# Patient Record
Sex: FEMALE | Race: WHITE | URBAN - METROPOLITAN AREA
[De-identification: names, ages, dates, MRNs, and addresses within clinical notes are randomized per-mention and may not be internally consistent; named-entity substitution may affect disease eponyms.]

---

## 2020-12-01 ENCOUNTER — OFFICE VISIT (OUTPATIENT)
Dept: URBAN - METROPOLITAN AREA CLINIC 45 | Facility: CLINIC | Age: 75
End: 2020-12-01
Payer: MEDICARE

## 2020-12-01 DIAGNOSIS — E11.9 TYPE 2 DIABETES MELLITUS WITHOUT COMPLICATIONS: ICD-10-CM

## 2020-12-01 DIAGNOSIS — H35.3131 NONEXUDATIVE AGE-RELATED MACULAR DEGENERATION, BILATERAL, EARLY DRY STAGE: Primary | ICD-10-CM

## 2020-12-01 PROCEDURE — 92004 COMPRE OPH EXAM NEW PT 1/>: CPT | Performed by: OPTOMETRIST

## 2020-12-01 PROCEDURE — 92134 CPTRZ OPH DX IMG PST SGM RTA: CPT | Performed by: OPTOMETRIST

## 2020-12-01 ASSESSMENT — VISUAL ACUITY
OD: 20/30
OS: 20/40

## 2020-12-01 ASSESSMENT — INTRAOCULAR PRESSURE
OD: 14
OS: 14

## 2020-12-01 ASSESSMENT — KERATOMETRY
OD: 46.50
OS: 46.75

## 2020-12-01 NOTE — IMPRESSION/PLAN
Impression: Type 2 diabetes mellitus without complications: W28.8. Plan: Diabetes type II: no background retinopathy, no signs of neovascularization noted. Discussed ocular and systemic benefits of blood sugar control.

## 2020-12-01 NOTE — IMPRESSION/PLAN
Impression: Nonexudative age-related macular degeneration, bilateral, early dry stage: H35.3131.  Plan: AFSHAN HECK

MAC OCT done today, reliable, normal ou

## 2021-12-15 ENCOUNTER — OFFICE VISIT (OUTPATIENT)
Dept: URBAN - METROPOLITAN AREA CLINIC 45 | Facility: CLINIC | Age: 76
End: 2021-12-15
Payer: MEDICARE

## 2021-12-15 DIAGNOSIS — H04.123 DRY EYE SYNDROME OF BILATERAL LACRIMAL GLANDS: ICD-10-CM

## 2021-12-15 DIAGNOSIS — H52.4 PRESBYOPIA: ICD-10-CM

## 2021-12-15 PROCEDURE — 92134 CPTRZ OPH DX IMG PST SGM RTA: CPT | Performed by: OPTOMETRIST

## 2021-12-15 PROCEDURE — 92014 COMPRE OPH EXAM EST PT 1/>: CPT | Performed by: OPTOMETRIST

## 2021-12-15 ASSESSMENT — VISUAL ACUITY
OS: 20/30
OD: 20/25

## 2021-12-15 ASSESSMENT — INTRAOCULAR PRESSURE
OS: 16
OD: 16

## 2021-12-15 NOTE — IMPRESSION/PLAN
Impression: Type 2 diabetes mellitus without complications: X49.4. Plan: Diabetes type II: no background retinopathy, no signs of neovascularization noted. Discussed ocular and systemic benefits of blood sugar control.

## 2021-12-15 NOTE — IMPRESSION/PLAN
Impression: Nonexudative age-related macular degeneration, bilateral, early dry stage: H35.3131. Plan: oct ordered and done today ou- Irregular w/ no SRF OU. 

 Luciana Max

## 2023-02-03 ENCOUNTER — OFFICE VISIT (OUTPATIENT)
Dept: URBAN - METROPOLITAN AREA CLINIC 45 | Facility: CLINIC | Age: 78
End: 2023-02-03
Payer: MEDICARE

## 2023-02-03 DIAGNOSIS — E11.9 TYPE 2 DIABETES MELLITUS WITHOUT COMPLICATIONS: Primary | ICD-10-CM

## 2023-02-03 DIAGNOSIS — H35.3122 NEXDTVE AGE-RELATED MCLR DEGN, LEFT EYE, INTERMED DRY STAGE: ICD-10-CM

## 2023-02-03 DIAGNOSIS — H04.123 DRY EYE SYNDROME OF BILATERAL LACRIMAL GLANDS: ICD-10-CM

## 2023-02-03 DIAGNOSIS — H35.3111 NONEXUDATIVE AGE-RELATED MACULAR DEGENERATION OF RIGHT EYE, EARLY DRY STAGE: ICD-10-CM

## 2023-02-03 PROCEDURE — 92134 CPTRZ OPH DX IMG PST SGM RTA: CPT | Performed by: OPTOMETRIST

## 2023-02-03 PROCEDURE — 92014 COMPRE OPH EXAM EST PT 1/>: CPT | Performed by: OPTOMETRIST

## 2023-02-03 ASSESSMENT — INTRAOCULAR PRESSURE
OS: 24
OD: 20

## 2023-02-03 NOTE — IMPRESSION/PLAN
Impression: Nonexudative age-related macular degeneration of right eye, early dry stage: H35.3111.  Plan: AMSLER, AREDS 2 explained

## 2023-02-03 NOTE — IMPRESSION/PLAN
Impression: Nexdtve age-related mclr degn, left eye, intermed dry stage: H35.3122.  Plan: AMSLER, AREDS 2 explained

## 2023-02-03 NOTE — IMPRESSION/PLAN
Impression: Type 2 diabetes mellitus without complications: Q77.9. Plan: Diabetes type II: no background retinopathy, no signs of neovascularization noted. Discussed ocular and systemic benefits of blood sugar control.

## 2024-04-11 ENCOUNTER — OFFICE VISIT (OUTPATIENT)
Dept: URBAN - METROPOLITAN AREA CLINIC 45 | Facility: CLINIC | Age: 79
End: 2024-04-11
Payer: MEDICARE

## 2024-04-11 DIAGNOSIS — H40.023 OPEN ANGLE WITH BORDERLINE FINDINGS, HIGH RISK, BILATERAL: ICD-10-CM

## 2024-04-11 DIAGNOSIS — E11.9 TYPE 2 DIABETES MELLITUS WITHOUT COMPLICATIONS: ICD-10-CM

## 2024-04-11 DIAGNOSIS — H35.3123 NEXDTVE AGE-REL MCLR DEGN, L EYE, ADV ATRPC W/O SBFVL INVL: Primary | ICD-10-CM

## 2024-04-11 DIAGNOSIS — H35.3111 NONEXUDATIVE AGE-RELATED MACULAR DEGENERATION OF RIGHT EYE, EARLY DRY STAGE: ICD-10-CM

## 2024-04-11 PROCEDURE — 92134 CPTRZ OPH DX IMG PST SGM RTA: CPT | Performed by: OPTOMETRIST

## 2024-04-11 PROCEDURE — 99214 OFFICE O/P EST MOD 30 MIN: CPT | Performed by: OPTOMETRIST

## 2024-04-11 ASSESSMENT — INTRAOCULAR PRESSURE
OD: 18
OS: 19

## 2024-04-29 ENCOUNTER — OFFICE VISIT (OUTPATIENT)
Dept: URBAN - METROPOLITAN AREA CLINIC 45 | Facility: CLINIC | Age: 79
End: 2024-04-29
Payer: MEDICARE

## 2024-04-29 DIAGNOSIS — H35.3124 NONEXUDATIVE AGE-RELATED MACULAR DEGENERATION OF LEFT EYE, ADVANCED ATROPHIC W/ SUBFOVEAL INVOLVEMENT: ICD-10-CM

## 2024-04-29 DIAGNOSIS — H35.3113 NONEXUDATIVE AGE-RELATED MACULAR DEGENERATION OF RIGHT EYE, ADVANCED ATROPHIC WITHOUT SUBFOVEAL INVOLVEMENT: Primary | ICD-10-CM

## 2024-04-29 DIAGNOSIS — H43.813 VITREOUS DEGENERATION, BILATERAL: ICD-10-CM

## 2024-04-29 PROCEDURE — 92134 CPTRZ OPH DX IMG PST SGM RTA: CPT | Performed by: OPHTHALMOLOGY

## 2024-04-29 PROCEDURE — 92004 COMPRE OPH EXAM NEW PT 1/>: CPT | Performed by: OPHTHALMOLOGY

## 2024-04-29 ASSESSMENT — INTRAOCULAR PRESSURE
OS: 18
OD: 18

## 2024-05-01 ENCOUNTER — OFFICE VISIT (OUTPATIENT)
Dept: URBAN - METROPOLITAN AREA CLINIC 45 | Facility: CLINIC | Age: 79
End: 2024-05-01
Payer: MEDICARE

## 2024-05-01 DIAGNOSIS — H04.123 DRY EYE SYNDROME OF BILATERAL LACRIMAL GLANDS: ICD-10-CM

## 2024-05-01 DIAGNOSIS — H40.023 OPEN ANGLE WITH BORDERLINE FINDINGS, HIGH RISK, BILATERAL: Primary | ICD-10-CM

## 2024-05-01 PROCEDURE — 92083 EXTENDED VISUAL FIELD XM: CPT | Performed by: OPTOMETRIST

## 2024-05-01 PROCEDURE — 76514 ECHO EXAM OF EYE THICKNESS: CPT | Performed by: OPTOMETRIST

## 2024-05-01 PROCEDURE — 99213 OFFICE O/P EST LOW 20 MIN: CPT | Performed by: OPTOMETRIST

## 2024-05-01 PROCEDURE — 92133 CPTRZD OPH DX IMG PST SGM ON: CPT | Performed by: OPTOMETRIST

## 2024-05-01 ASSESSMENT — INTRAOCULAR PRESSURE
OD: 17
OS: 17